# Patient Record
Sex: MALE | Race: ASIAN | NOT HISPANIC OR LATINO | URBAN - METROPOLITAN AREA
[De-identification: names, ages, dates, MRNs, and addresses within clinical notes are randomized per-mention and may not be internally consistent; named-entity substitution may affect disease eponyms.]

---

## 2017-01-01 ENCOUNTER — INPATIENT (INPATIENT)
Facility: HOSPITAL | Age: 72
LOS: 1 days | DRG: 204 | End: 2017-12-23
Attending: INTERNAL MEDICINE | Admitting: INTERNAL MEDICINE
Payer: OTHER MISCELLANEOUS

## 2017-01-01 ENCOUNTER — INPATIENT (INPATIENT)
Facility: HOSPITAL | Age: 72
LOS: 2 days | Discharge: HOME | DRG: 682 | End: 2017-12-21
Attending: INTERNAL MEDICINE | Admitting: INTERNAL MEDICINE
Payer: MEDICARE

## 2017-01-01 VITALS
HEART RATE: 93 BPM | DIASTOLIC BLOOD PRESSURE: 93 MMHG | OXYGEN SATURATION: 100 % | RESPIRATION RATE: 16 BRPM | SYSTOLIC BLOOD PRESSURE: 222 MMHG | TEMPERATURE: 98 F

## 2017-01-01 VITALS — WEIGHT: 105.82 LBS | HEIGHT: 66 IN

## 2017-01-01 VITALS
TEMPERATURE: 97 F | SYSTOLIC BLOOD PRESSURE: 207 MMHG | RESPIRATION RATE: 20 BRPM | HEART RATE: 88 BPM | OXYGEN SATURATION: 100 % | DIASTOLIC BLOOD PRESSURE: 80 MMHG

## 2017-01-01 VITALS
TEMPERATURE: 98 F | DIASTOLIC BLOOD PRESSURE: 26 MMHG | HEART RATE: 95 BPM | RESPIRATION RATE: 17 BRPM | OXYGEN SATURATION: 100 % | SYSTOLIC BLOOD PRESSURE: 109 MMHG

## 2017-01-01 DIAGNOSIS — N18.6 END STAGE RENAL DISEASE: ICD-10-CM

## 2017-01-01 DIAGNOSIS — I10 ESSENTIAL (PRIMARY) HYPERTENSION: ICD-10-CM

## 2017-01-01 DIAGNOSIS — R06.02 SHORTNESS OF BREATH: ICD-10-CM

## 2017-01-01 DIAGNOSIS — R41.0 DISORIENTATION, UNSPECIFIED: ICD-10-CM

## 2017-01-01 DIAGNOSIS — E11.9 TYPE 2 DIABETES MELLITUS WITHOUT COMPLICATIONS: ICD-10-CM

## 2017-01-01 DIAGNOSIS — Z51.5 ENCOUNTER FOR PALLIATIVE CARE: ICD-10-CM

## 2017-01-01 DIAGNOSIS — K11.7 DISTURBANCES OF SALIVARY SECRETION: ICD-10-CM

## 2017-01-01 DIAGNOSIS — Z71.89 OTHER SPECIFIED COUNSELING: ICD-10-CM

## 2017-01-01 DIAGNOSIS — R06.00 DYSPNEA, UNSPECIFIED: ICD-10-CM

## 2017-01-01 DIAGNOSIS — Z95.5 PRESENCE OF CORONARY ANGIOPLASTY IMPLANT AND GRAFT: Chronic | ICD-10-CM

## 2017-01-01 DIAGNOSIS — R52 PAIN, UNSPECIFIED: ICD-10-CM

## 2017-01-01 DIAGNOSIS — R45.1 RESTLESSNESS AND AGITATION: ICD-10-CM

## 2017-01-01 DIAGNOSIS — K59.03 DRUG INDUCED CONSTIPATION: ICD-10-CM

## 2017-01-01 DIAGNOSIS — L98.9 DISORDER OF THE SKIN AND SUBCUTANEOUS TISSUE, UNSPECIFIED: ICD-10-CM

## 2017-01-01 LAB
GLUCOSE BLDC GLUCOMTR-MCNC: 102 MG/DL — HIGH (ref 70–99)
GLUCOSE BLDC GLUCOMTR-MCNC: 99 MG/DL — SIGNIFICANT CHANGE UP (ref 70–99)

## 2017-01-01 PROCEDURE — 82962 GLUCOSE BLOOD TEST: CPT

## 2017-01-01 PROCEDURE — 99223 1ST HOSP IP/OBS HIGH 75: CPT

## 2017-01-01 PROCEDURE — 99285 EMERGENCY DEPT VISIT HI MDM: CPT | Mod: 25

## 2017-01-01 PROCEDURE — 99285 EMERGENCY DEPT VISIT HI MDM: CPT

## 2017-01-01 RX ORDER — HYDRALAZINE HCL 50 MG
10 TABLET ORAL ONCE
Qty: 0 | Refills: 0 | Status: COMPLETED | OUTPATIENT
Start: 2017-01-01 | End: 2017-01-01

## 2017-01-01 RX ORDER — SODIUM CHLORIDE 9 MG/ML
3 INJECTION INTRAMUSCULAR; INTRAVENOUS; SUBCUTANEOUS EVERY 8 HOURS
Qty: 0 | Refills: 0 | Status: DISCONTINUED | OUTPATIENT
Start: 2017-01-01 | End: 2017-01-01

## 2017-01-01 RX ORDER — LABETALOL HCL 100 MG
10 TABLET ORAL ONCE
Qty: 0 | Refills: 0 | Status: COMPLETED | OUTPATIENT
Start: 2017-01-01 | End: 2017-01-01

## 2017-01-01 RX ORDER — ROBINUL 0.2 MG/ML
0.2 INJECTION INTRAMUSCULAR; INTRAVENOUS EVERY 6 HOURS
Qty: 0 | Refills: 0 | Status: DISCONTINUED | OUTPATIENT
Start: 2017-01-01 | End: 2017-01-01

## 2017-01-01 RX ORDER — HALOPERIDOL DECANOATE 100 MG/ML
0.5 INJECTION INTRAMUSCULAR EVERY 6 HOURS
Qty: 0 | Refills: 0 | Status: COMPLETED | OUTPATIENT
Start: 2017-01-01 | End: 2017-01-01

## 2017-01-01 RX ORDER — ACETAMINOPHEN 500 MG
650 TABLET ORAL
Qty: 0 | Refills: 0 | Status: DISCONTINUED | OUTPATIENT
Start: 2017-01-01 | End: 2017-01-01

## 2017-01-01 RX ORDER — HYDROMORPHONE HYDROCHLORIDE 2 MG/ML
2 INJECTION INTRAMUSCULAR; INTRAVENOUS; SUBCUTANEOUS
Qty: 0 | Refills: 0 | Status: DISCONTINUED | OUTPATIENT
Start: 2017-01-01 | End: 2017-01-01

## 2017-01-01 RX ORDER — HYDRALAZINE HCL 50 MG
1 TABLET ORAL
Qty: 0 | Refills: 0 | COMMUNITY
Start: 2017-01-01

## 2017-01-01 RX ORDER — ROBINUL 0.2 MG/ML
0.4 INJECTION INTRAMUSCULAR; INTRAVENOUS EVERY 4 HOURS
Qty: 0 | Refills: 0 | Status: DISCONTINUED | OUTPATIENT
Start: 2017-01-01 | End: 2017-01-01

## 2017-01-01 RX ORDER — HYDRALAZINE HCL 50 MG
10 TABLET ORAL EVERY 6 HOURS
Qty: 0 | Refills: 0 | Status: DISCONTINUED | OUTPATIENT
Start: 2017-01-01 | End: 2017-01-01

## 2017-01-01 RX ORDER — HALOPERIDOL DECANOATE 100 MG/ML
0.5 INJECTION INTRAMUSCULAR EVERY 6 HOURS
Qty: 0 | Refills: 0 | Status: DISCONTINUED | OUTPATIENT
Start: 2017-01-01 | End: 2017-01-01

## 2017-01-01 RX ORDER — HYDRALAZINE HCL 50 MG
10 TABLET ORAL EVERY 8 HOURS
Qty: 0 | Refills: 0 | Status: DISCONTINUED | OUTPATIENT
Start: 2017-01-01 | End: 2017-01-01

## 2017-01-01 RX ORDER — HYDROMORPHONE HYDROCHLORIDE 2 MG/ML
0.5 INJECTION INTRAMUSCULAR; INTRAVENOUS; SUBCUTANEOUS
Qty: 0 | Refills: 0 | Status: DISCONTINUED | OUTPATIENT
Start: 2017-01-01 | End: 2017-01-01

## 2017-01-01 RX ORDER — SODIUM CHLORIDE 9 MG/ML
1000 INJECTION INTRAMUSCULAR; INTRAVENOUS; SUBCUTANEOUS
Qty: 0 | Refills: 0 | Status: DISCONTINUED | OUTPATIENT
Start: 2017-01-01 | End: 2017-01-01

## 2017-01-01 RX ORDER — HYDROMORPHONE HYDROCHLORIDE 2 MG/ML
0.5 INJECTION INTRAMUSCULAR; INTRAVENOUS; SUBCUTANEOUS ONCE
Qty: 0 | Refills: 0 | Status: DISCONTINUED | OUTPATIENT
Start: 2017-01-01 | End: 2017-01-01

## 2017-01-01 RX ORDER — FENTANYL CITRATE 50 UG/ML
50 INJECTION INTRAVENOUS
Qty: 0 | Refills: 0 | Status: DISCONTINUED | OUTPATIENT
Start: 2017-01-01 | End: 2017-01-01

## 2017-01-01 RX ORDER — HYDROMORPHONE HYDROCHLORIDE 2 MG/ML
1 INJECTION INTRAMUSCULAR; INTRAVENOUS; SUBCUTANEOUS
Qty: 100 | Refills: 0 | Status: DISCONTINUED | OUTPATIENT
Start: 2017-01-01 | End: 2017-01-01

## 2017-01-01 RX ORDER — HALOPERIDOL DECANOATE 100 MG/ML
5 INJECTION INTRAMUSCULAR ONCE
Qty: 0 | Refills: 0 | Status: COMPLETED | OUTPATIENT
Start: 2017-01-01 | End: 2017-01-01

## 2017-01-01 RX ORDER — HYDRALAZINE HCL 50 MG
10 TABLET ORAL ONCE
Qty: 0 | Refills: 0 | Status: DISCONTINUED | OUTPATIENT
Start: 2017-01-01 | End: 2017-01-01

## 2017-01-01 RX ADMIN — HYDROMORPHONE HYDROCHLORIDE 2 MILLIGRAM(S): 2 INJECTION INTRAMUSCULAR; INTRAVENOUS; SUBCUTANEOUS at 11:25

## 2017-01-01 RX ADMIN — FENTANYL CITRATE 50 MICROGRAM(S): 50 INJECTION INTRAVENOUS at 08:15

## 2017-01-01 RX ADMIN — HYDROMORPHONE HYDROCHLORIDE 0.5 MILLIGRAM(S): 2 INJECTION INTRAMUSCULAR; INTRAVENOUS; SUBCUTANEOUS at 05:10

## 2017-01-01 RX ADMIN — FENTANYL CITRATE 50 MICROGRAM(S): 50 INJECTION INTRAVENOUS at 05:45

## 2017-01-01 RX ADMIN — Medication 1 MILLIGRAM(S): at 15:40

## 2017-01-01 RX ADMIN — Medication 1 MILLIGRAM(S): at 18:36

## 2017-01-01 RX ADMIN — Medication 2 MILLIGRAM(S): at 00:22

## 2017-01-01 RX ADMIN — Medication 10 MILLIGRAM(S): at 14:20

## 2017-01-01 RX ADMIN — Medication 1 MILLIGRAM(S): at 12:20

## 2017-01-01 RX ADMIN — FENTANYL CITRATE 50 MICROGRAM(S): 50 INJECTION INTRAVENOUS at 02:52

## 2017-01-01 RX ADMIN — HYDROMORPHONE HYDROCHLORIDE 0.5 MILLIGRAM(S): 2 INJECTION INTRAMUSCULAR; INTRAVENOUS; SUBCUTANEOUS at 18:07

## 2017-01-01 RX ADMIN — Medication 1 MILLIGRAM(S): at 02:52

## 2017-01-01 RX ADMIN — FENTANYL CITRATE 50 MICROGRAM(S): 50 INJECTION INTRAVENOUS at 14:01

## 2017-01-01 RX ADMIN — FENTANYL CITRATE 50 MICROGRAM(S): 50 INJECTION INTRAVENOUS at 07:11

## 2017-01-01 RX ADMIN — FENTANYL CITRATE 50 MICROGRAM(S): 50 INJECTION INTRAVENOUS at 08:31

## 2017-01-01 RX ADMIN — ROBINUL 0.2 MILLIGRAM(S): 0.2 INJECTION INTRAMUSCULAR; INTRAVENOUS at 18:37

## 2017-01-01 RX ADMIN — FENTANYL CITRATE 50 MICROGRAM(S): 50 INJECTION INTRAVENOUS at 09:56

## 2017-01-01 RX ADMIN — HYDROMORPHONE HYDROCHLORIDE 0.5 MILLIGRAM(S): 2 INJECTION INTRAMUSCULAR; INTRAVENOUS; SUBCUTANEOUS at 21:13

## 2017-01-01 RX ADMIN — FENTANYL CITRATE 50 MICROGRAM(S): 50 INJECTION INTRAVENOUS at 14:00

## 2017-01-01 RX ADMIN — SODIUM CHLORIDE 3 MILLILITER(S): 9 INJECTION INTRAMUSCULAR; INTRAVENOUS; SUBCUTANEOUS at 21:11

## 2017-01-01 RX ADMIN — Medication 10 MILLIGRAM(S): at 14:00

## 2017-01-01 RX ADMIN — SODIUM CHLORIDE 3 MILLILITER(S): 9 INJECTION INTRAMUSCULAR; INTRAVENOUS; SUBCUTANEOUS at 05:10

## 2017-01-01 RX ADMIN — Medication 10 MILLIGRAM(S): at 22:18

## 2017-01-01 RX ADMIN — FENTANYL CITRATE 50 MICROGRAM(S): 50 INJECTION INTRAVENOUS at 18:42

## 2017-01-01 RX ADMIN — SODIUM CHLORIDE 3 MILLILITER(S): 9 INJECTION INTRAMUSCULAR; INTRAVENOUS; SUBCUTANEOUS at 14:05

## 2017-01-01 RX ADMIN — Medication 0.5 MILLIGRAM(S): at 14:26

## 2017-01-01 RX ADMIN — HYDROMORPHONE HYDROCHLORIDE 0.5 MILLIGRAM(S): 2 INJECTION INTRAMUSCULAR; INTRAVENOUS; SUBCUTANEOUS at 20:59

## 2017-01-01 RX ADMIN — Medication 10 MILLIGRAM(S): at 09:00

## 2017-01-01 RX ADMIN — SODIUM CHLORIDE 3 MILLILITER(S): 9 INJECTION INTRAMUSCULAR; INTRAVENOUS; SUBCUTANEOUS at 21:54

## 2017-01-01 RX ADMIN — HYDROMORPHONE HYDROCHLORIDE 0.5 MILLIGRAM(S): 2 INJECTION INTRAMUSCULAR; INTRAVENOUS; SUBCUTANEOUS at 21:29

## 2017-01-01 RX ADMIN — ROBINUL 0.4 MILLIGRAM(S): 0.2 INJECTION INTRAMUSCULAR; INTRAVENOUS at 11:16

## 2017-01-01 RX ADMIN — FENTANYL CITRATE 50 MICROGRAM(S): 50 INJECTION INTRAVENOUS at 12:21

## 2017-01-01 RX ADMIN — Medication 0.1 MILLIGRAM(S): at 21:50

## 2017-01-01 RX ADMIN — Medication 1 MILLIGRAM(S): at 08:31

## 2017-01-01 RX ADMIN — Medication 10 MILLIGRAM(S): at 05:56

## 2017-01-01 RX ADMIN — FENTANYL CITRATE 50 MICROGRAM(S): 50 INJECTION INTRAVENOUS at 18:37

## 2017-01-01 RX ADMIN — SODIUM CHLORIDE 3 MILLILITER(S): 9 INJECTION INTRAMUSCULAR; INTRAVENOUS; SUBCUTANEOUS at 05:54

## 2017-01-01 RX ADMIN — SODIUM CHLORIDE 3 MILLILITER(S): 9 INJECTION INTRAMUSCULAR; INTRAVENOUS; SUBCUTANEOUS at 21:13

## 2017-01-01 RX ADMIN — FENTANYL CITRATE 50 MICROGRAM(S): 50 INJECTION INTRAVENOUS at 08:32

## 2017-01-01 RX ADMIN — Medication 1 MILLIGRAM(S): at 17:03

## 2017-01-01 RX ADMIN — SODIUM CHLORIDE 3 MILLILITER(S): 9 INJECTION INTRAMUSCULAR; INTRAVENOUS; SUBCUTANEOUS at 13:00

## 2017-01-01 RX ADMIN — Medication 0.5 MILLIGRAM(S): at 09:43

## 2017-01-01 RX ADMIN — Medication 1 MILLIGRAM(S): at 06:54

## 2017-01-01 RX ADMIN — FENTANYL CITRATE 50 MICROGRAM(S): 50 INJECTION INTRAVENOUS at 00:53

## 2017-01-01 RX ADMIN — HYDROMORPHONE HYDROCHLORIDE 0.5 MILLIGRAM(S): 2 INJECTION INTRAMUSCULAR; INTRAVENOUS; SUBCUTANEOUS at 19:02

## 2017-01-01 RX ADMIN — Medication 10 MILLIGRAM(S): at 05:10

## 2017-01-01 RX ADMIN — Medication 10 MILLIGRAM(S): at 23:57

## 2017-01-01 RX ADMIN — HYDROMORPHONE HYDROCHLORIDE 0.5 MILLIGRAM(S): 2 INJECTION INTRAMUSCULAR; INTRAVENOUS; SUBCUTANEOUS at 05:40

## 2017-01-01 RX ADMIN — Medication 10 MILLIGRAM(S): at 11:58

## 2017-01-01 RX ADMIN — Medication 1 MILLIGRAM(S): at 05:20

## 2017-01-01 RX ADMIN — Medication 1 MILLIGRAM(S): at 21:48

## 2017-01-01 RX ADMIN — Medication 0.1 MILLIGRAM(S): at 05:56

## 2017-01-01 RX ADMIN — HALOPERIDOL DECANOATE 5 MILLIGRAM(S): 100 INJECTION INTRAMUSCULAR at 21:45

## 2017-01-01 RX ADMIN — SODIUM CHLORIDE 3 MILLILITER(S): 9 INJECTION INTRAMUSCULAR; INTRAVENOUS; SUBCUTANEOUS at 06:19

## 2017-01-01 RX ADMIN — Medication 2 MILLIGRAM(S): at 11:16

## 2017-01-01 RX ADMIN — FENTANYL CITRATE 50 MICROGRAM(S): 50 INJECTION INTRAVENOUS at 05:34

## 2017-01-01 RX ADMIN — ROBINUL 0.2 MILLIGRAM(S): 0.2 INJECTION INTRAMUSCULAR; INTRAVENOUS at 12:20

## 2017-01-01 RX ADMIN — Medication 10 MILLIGRAM(S): at 13:00

## 2017-01-01 RX ADMIN — HYDROMORPHONE HYDROCHLORIDE 0.5 MILLIGRAM(S): 2 INJECTION INTRAMUSCULAR; INTRAVENOUS; SUBCUTANEOUS at 02:38

## 2017-01-01 RX ADMIN — Medication 1 MILLIGRAM(S): at 10:01

## 2017-01-01 RX ADMIN — HYDROMORPHONE HYDROCHLORIDE 2 MILLIGRAM(S): 2 INJECTION INTRAMUSCULAR; INTRAVENOUS; SUBCUTANEOUS at 11:16

## 2017-01-01 RX ADMIN — SODIUM CHLORIDE 3 MILLILITER(S): 9 INJECTION INTRAMUSCULAR; INTRAVENOUS; SUBCUTANEOUS at 13:56

## 2017-01-01 RX ADMIN — Medication 1 MILLIGRAM(S): at 20:08

## 2017-01-01 RX ADMIN — Medication 10 MILLIGRAM(S): at 17:56

## 2017-01-01 RX ADMIN — FENTANYL CITRATE 50 MICROGRAM(S): 50 INJECTION INTRAVENOUS at 03:10

## 2017-01-01 RX ADMIN — Medication 1 MILLIGRAM(S): at 03:42

## 2017-01-01 RX ADMIN — Medication 2 MILLIGRAM(S): at 05:36

## 2017-01-01 RX ADMIN — HALOPERIDOL DECANOATE 0.5 MILLIGRAM(S): 100 INJECTION INTRAMUSCULAR at 18:08

## 2017-01-01 RX ADMIN — Medication 1 MILLIGRAM(S): at 08:16

## 2017-01-01 RX ADMIN — FENTANYL CITRATE 50 MICROGRAM(S): 50 INJECTION INTRAVENOUS at 00:41

## 2017-01-01 RX ADMIN — Medication 1 MILLIGRAM(S): at 22:19

## 2017-01-01 RX ADMIN — Medication 10 MILLIGRAM(S): at 05:37

## 2017-01-01 RX ADMIN — HYDROMORPHONE HYDROCHLORIDE 0.5 MILLIGRAM(S): 2 INJECTION INTRAMUSCULAR; INTRAVENOUS; SUBCUTANEOUS at 03:08

## 2017-01-01 RX ADMIN — FENTANYL CITRATE 50 MICROGRAM(S): 50 INJECTION INTRAVENOUS at 17:03

## 2017-01-01 RX ADMIN — FENTANYL CITRATE 50 MICROGRAM(S): 50 INJECTION INTRAVENOUS at 06:54

## 2017-01-01 RX ADMIN — FENTANYL CITRATE 50 MICROGRAM(S): 50 INJECTION INTRAVENOUS at 05:20

## 2017-01-01 RX ADMIN — HYDROMORPHONE HYDROCHLORIDE 0.5 MILLIGRAM(S): 2 INJECTION INTRAMUSCULAR; INTRAVENOUS; SUBCUTANEOUS at 20:07

## 2017-01-01 RX ADMIN — FENTANYL CITRATE 50 MICROGRAM(S): 50 INJECTION INTRAVENOUS at 10:09

## 2017-01-01 RX ADMIN — Medication 1 MILLIGRAM(S): at 05:38

## 2017-01-01 RX ADMIN — FENTANYL CITRATE 50 MICROGRAM(S): 50 INJECTION INTRAVENOUS at 01:40

## 2017-01-01 RX ADMIN — HYDROMORPHONE HYDROCHLORIDE 1 MG/HR: 2 INJECTION INTRAMUSCULAR; INTRAVENOUS; SUBCUTANEOUS at 11:57

## 2017-01-01 RX ADMIN — FENTANYL CITRATE 50 MICROGRAM(S): 50 INJECTION INTRAVENOUS at 06:00

## 2017-01-01 RX ADMIN — Medication 1 MILLIGRAM(S): at 14:00

## 2017-01-01 RX ADMIN — FENTANYL CITRATE 50 MICROGRAM(S): 50 INJECTION INTRAVENOUS at 12:25

## 2017-01-01 RX ADMIN — SODIUM CHLORIDE 10 MILLILITER(S): 9 INJECTION INTRAMUSCULAR; INTRAVENOUS; SUBCUTANEOUS at 11:58

## 2017-01-01 RX ADMIN — Medication 0.5 MILLIGRAM(S): at 23:57

## 2017-01-01 RX ADMIN — FENTANYL CITRATE 50 MICROGRAM(S): 50 INJECTION INTRAVENOUS at 02:00

## 2017-01-01 RX ADMIN — HALOPERIDOL DECANOATE 0.5 MILLIGRAM(S): 100 INJECTION INTRAMUSCULAR at 23:39

## 2017-01-01 RX ADMIN — Medication 10 MILLIGRAM(S): at 23:46

## 2017-01-01 RX ADMIN — FENTANYL CITRATE 50 MICROGRAM(S): 50 INJECTION INTRAVENOUS at 18:26

## 2017-01-01 RX ADMIN — FENTANYL CITRATE 50 MICROGRAM(S): 50 INJECTION INTRAVENOUS at 10:01

## 2017-12-18 NOTE — H&P ADULT - PROBLEM SELECTOR PLAN 5
Talked to son Hector on Ph . He states that he is the HCP, HCP paper are with HD center, that he will bring tomorrow. He wants comfort measures for his father and refuses to continue HD. He understands that,  withholding HD will eventually cause fluid overload in the body and will likely cause respiratory distress that can lead to death. Son don't want any lab work done on patient.   Palliative team to follow in the morning.

## 2017-12-18 NOTE — H&P ADULT - ASSESSMENT
72 M with pmh of ESRD, dementia, DM and Htn was brought to ED by family for comfort measures for the patient.

## 2017-12-18 NOTE — ED ADULT NURSE NOTE - ED STAT RN HANDOFF DETAILS 2
Pt noted awake confused screaming at times b/p remains high NP Odetta made aware Hydralazine 10 mg IVP given Pt admitted to 6S for comfort measurement hand off report given to Mariia WISEMAN

## 2017-12-18 NOTE — H&P ADULT - PROBLEM SELECTOR PLAN 1
-Patient and son Hector don't want HD done. Last HD was one week ago. Patient goes to [facility]. Dr Hebert is nephrologist.   -Holding HD for now as son refuses to consent for HD.

## 2017-12-18 NOTE — H&P ADULT - HISTORY OF PRESENT ILLNESS
72 M with pmh of ESRD, dementia, DM and Htn was brought to ED by family for comfort measures for the patient. Patient is a dialysis patient, goes to [facility] HD center. last HD was on Tuesday (one week ago). Now patient and son don't want to continue HD. Patient is confused AxO x0 on examination. ROS and history is limited and is as per Celestino Young. ROS positive for mild cough, negative for fever, SOB, chest pain, headache, pedal edema.    Talked to son Hector on Ph . He states that he is the HCP, HCP paper are with HD center, that he will bring tomorrow. He wants comfort measures for his father and refuses to continue HD. He understands that,  withholding HD will eventually cause fluid overload in the body and will likely cause respiratory distress that can lead to death. Son don't want any lab work done on patient.

## 2017-12-18 NOTE — H&P ADULT - PROBLEM SELECTOR PLAN 3
-BP is running on higher side. Suggest to use hydralazine iv for BP control aiming comfort measures.

## 2017-12-18 NOTE — H&P ADULT - NSHPLABSRESULTS_GEN_ALL_CORE
Vital Signs Last 24 Hrs  T(C): 36.3 (18 Dec 2017 19:53), Max: 36.3 (18 Dec 2017 19:53)  T(F): 97.4 (18 Dec 2017 19:53), Max: 97.4 (18 Dec 2017 19:53)  HR: 95 (18 Dec 2017 19:53) (95 - 95)  BP: 223/100 (18 Dec 2017 19:53) (223/100 - 223/100)  BP(mean): --  RR: 20 (18 Dec 2017 19:53) (20 - 20)  SpO2: 100% (18 Dec 2017 19:53) (100% - 100%)

## 2017-12-18 NOTE — ED ADULT NURSE NOTE - NS ED NURSE RECORD ANOTHER HT AND WT
Counseling and Referral of Other Preventative  (Italic type indicates deductible and co-insurance are waived)    Patient Name: Jaymie Mtz  Today's Date: 3/27/2017      SERVICE LIMITATIONS RECOMMENDATION    Vaccines    · Pneumococcal (once after 65)    · Influenza (annually)    · Hepatitis B (if medium/high risk)    · Prevnar 13      Hepatitis B medium/high risk factors:       - End-stage renal disease       - Hemophiliacs who received Factor VII or         IX concentrates       - Clients of institutions for the mentally             retarded       - Persons who live in the same house as          a HepB carrier       - Homosexual men       - Illicit injectable drug abusers     Pneumococcal: Done, no repeat necessary     Influenza: Done, repeat in one year     Hepatitis B: N/A     Prevnar 13: Recommended to patient, due 4/19/2017    Mammogram (biennial age 50-74)  Annually (age 40 or over)  Last done 12/2016, recommend to repeat every 1  years    Pap (up to age 70 and after 70 if unknown history or abnormal study last 10 years)    Last done 4/18/2016, recommend to repeat every 3  years     The USPSTF recommends screening for cervical cancer in women age 21 to 65 years with cytology (Pap smear) every 3 years.     Colorectal cancer screening (to age 75)    · Fecal occult blood test (annual)  · Flexible sigmoidoscopy (5y)  · Screening colonoscopy (10y)  · Barium enema   Last done 9/17/2015, recommend to repeat every 10  years    Diabetes self-management training (no USPSTF recommendations)  Requires referral by treating physician for patient with diabetes or renal disease. 10 hours of initial DSMT sessions of no less than 30 minutes each in a continuous 12-month period. 2 hours of follow-up DSMT in subsequent years.  N/A    Bone mass measurements (age 65 & older, biennial)  Requires diagnosis related to osteoporosis or estrogen deficiency. Biennial benefit unless patient has history of long-term glucocorticoid  N/A     Glaucoma screening (no USPSTF recommendation)  Diabetes mellitus, family history   , age 50 or over    American, age 65 or over  completed 5/27/2016, next due 5/27/2017    Medical nutrition therapy for diabetes or renal disease (no recommended schedule)  Requires referral by treating physician for patient with diabetes or renal disease or kidney transplant within the past 3 years.  Can be provided in same year as diabetes self-management training (DSMT), and CMS recommends medical nutrition therapy take place after DSMT. Up to 3 hours for initial year and 2 hours in subsequent years.  n/a    Cardiovascular screening blood tests (every 5 years)  · Fasting lipid panel  Order as a panel if possible  Last done 11/30/2016, recommend to repeat every 1  years    Diabetes screening tests (at least every 3 years, Medicare covers annually or at 6-month intervals for prediabetic patients)  · Fasting blood sugar (FBS) or glucose tolerance test (GTT)  Patient must be diagnosed with one of the following:       - Hypertension       - Dyslipidemia       - Obesity (BMI 30kg/m2)       - Previous elevated impaired FBS or GTT       ... or any two of the following:       - Overweight (BMI 25 but <30)       - Family history of diabetes       - Age 65 or older       - History of gestational diabetes or birth of baby weighing more than 9 pounds  Done this year, repeat every year    Abdominal aortic aneurysm screening (once)  · Sonogram   Limited to patients who meet one of the following criteria:       - Men who are 65-75 years old and have smoked more than 100 cigarette in their lifetime       - Anyone with a family history of abdominal aortic aneurysm       - Anyone recommended for screening by the USPSTF  N/A    HIV screening (annually for increased risk patients)  · HIV-1 and HIV-2 by EIA, or VERNON, rapid antibody test or oral mucosa transudate  Patients must be at increased risk for HIV infection per USPSTF  guidelines or pregnant. Tests covered annually for patient at increased risk or as requested by the patient. Pregnant patients may receive up to 3 tests during pregnancy.  Risks discussed, screening is not recommended    Smoking cessation counseling (up to 8 sessions per year)  Patients must be asymptomatic of tobacco-related conditions to receive as a preventative service.  n/a    Subsequent annual wellness visit  At least 12 months since last AWV  Return in one year     The following information is provided to all patients.  This information is to help you find resources for any of the problems found today that may be affecting your health:                Living healthy guide: www.[facility].[place].[facility]      Understanding Diabetes: www.diabetes.org      Eating healthy: www.cdc.gov/healthyweight      CDC home safety checklist: www.cdc.gov/steadi/patient.html      Agency on Aging: www.goea.[place].[facility]      Alcoholics anonymous (AA): www.aa.org      Physical Activity: www.bob.nih.gov/ne6kjft      Tobacco use: www.quitwithusla.org      Yes

## 2017-12-18 NOTE — ED PROVIDER NOTE - OBJECTIVE STATEMENT
71 y/o M w/ PMHx of ESRD BIB son to ED for hospice care. Pt's son is his health proxy. He states that pt is choosing to not continue dialysis, he has not been dialyzed for one week now. He has been increasingly agitated, shortness of breath, w/ a cough. Dialysis center has referred pt to ED for admission for hospice care. Pt's son confirms that he is DNR/DNI.

## 2017-12-19 NOTE — CONSULT NOTE ADULT - SUBJECTIVE AND OBJECTIVE BOX
HPI:  72 M with pmh of ESRD, dementia, DM and Htn was brought to ED by family for comfort measures for the patient. Last HD was on Tuesday (one week ago). Now patient and son don't want to continue HD. Patient was agitated overnight, requiring PRN doses of Haldol, Ativan.    PAST MEDICAL & SURGICAL HISTORY:  ESRD (end stage renal disease)  HLD (hyperlipidemia)  HTN (hypertension), benign  Coronary artery disease  History of coronary artery stent placement    SOCIAL HISTORY:    Admitted from:  home, lives with son  Substance abuse history:              Tobacco hx: Unknown                 Alcohol hx: Unknown             Home Opioid hx: None  Temple:   Anabaptism                                 Preferred Language: English    Surrogate/HCP/Guardian:  Hector Simpson (Son, HCP) [phone]            FAMILY HISTORY: Unknown    Baseline ADLs (prior to admission): Ambulatory, with assistance    Allergies:  No Known Allergies           Review of Systems:  Unable to obtain due to poor mentation    MEDICATIONS  (STANDING):  hydrALAZINE 10 milliGRAM(s) Oral every 6 hours  sodium chloride 0.9% lock flush 3 milliLiter(s) IV Push every 8 hours    MEDICATIONS  (PRN):  haloperidol    Injectable 0.5 milliGRAM(s) IV Push every 6 hours PRN Agitation    PHYSICAL EXAM:    Vital Signs Last 24 Hrs  T(C): 36.4 (19 Dec 2017 07:44), Max: 36.7 (19 Dec 2017 06:04)  T(F): 97.5 (19 Dec 2017 07:44), Max: 98.1 (19 Dec 2017 06:04)  HR: 97 (19 Dec 2017 09:08) (81 - 111)  BP: 186/95 (19 Dec 2017 09:08) (185/83 - 223/100)  RR: 18 (19 Dec 2017 09:08) (18 - 20)  SpO2: 100% (19 Dec 2017 09:08) (96% - 100%)    General: alert  oriented x _1-2___    lethargic, cachexia, minimally verbal    Karnofsky Performance Score/Palliative Performance Status Version2:  20%    HEENT: normal  dry mouth  ET tube/trach oral lesions:  Lungs: comfortable tachypnea/labored breathing  excessive secretions  CV: normal  tachycardia  GI: normal  distended  tender  incontinent               PEG/NG/OG tube  constipation  last BM:   : normal  incontinent  oliguria/anuria  fuentes  Musculoskeletal: normal  weakness  edema             ambulatory  bedbound/wheelchair bound  Skin: normal  pressure ulcers: stage: edema: other:  Neuro: no deficits cognitive impairment dsyphagia/dysarthria paresis: other:  Oral intake ability: unable/only mouth care [minimal moderate full capability]  Diet: [NPO]    LABS:              RADIOLOGY & ADDITIONAL STUDIES:    ADVANCE DIRECTIVES:   Advanced Care Planning discussion total time spent: HPI:  72 M with pmh of ESRD, dementia, DM and Htn was brought to ED by family for comfort measures for the patient. Last HD was on Tuesday (one week ago). Now patient and son don't want to continue HD. Patient was agitated overnight, requiring PRN doses of Haldol, Ativan.    PAST MEDICAL & SURGICAL HISTORY:  ESRD (end stage renal disease)  HLD (hyperlipidemia)  HTN (hypertension), benign  Coronary artery disease  History of coronary artery stent placement    SOCIAL HISTORY:    Admitted from:  home, lives with son  Substance abuse history:              Tobacco hx: Unknown                 Alcohol hx: Unknown             Home Opioid hx: None  Yarsanism:   Faith                                 Preferred Language: English    Surrogate/HCP/Guardian:  Hector Simpson (Son, HCP) [phone]            FAMILY HISTORY: Unknown    Baseline ADLs (prior to admission): Ambulatory, with assistance    Allergies:  No Known Allergies           Review of Systems:  Unable to obtain due to poor mentation    MEDICATIONS  (STANDING):  hydrALAZINE 10 milliGRAM(s) Oral every 6 hours  sodium chloride 0.9% lock flush 3 milliLiter(s) IV Push every 8 hours    MEDICATIONS  (PRN):  haloperidol    Injectable 0.5 milliGRAM(s) IV Push every 6 hours PRN Agitation    PHYSICAL EXAM:    Vital Signs Last 24 Hrs  T(C): 36.4 (19 Dec 2017 07:44), Max: 36.7 (19 Dec 2017 06:04)  T(F): 97.5 (19 Dec 2017 07:44), Max: 98.1 (19 Dec 2017 06:04)  HR: 97 (19 Dec 2017 09:08) (81 - 111)  BP: 186/95 (19 Dec 2017 09:08) (185/83 - 223/100)  RR: 18 (19 Dec 2017 09:08) (18 - 20)  SpO2: 100% (19 Dec 2017 09:08) (96% - 100%)    General: alert  oriented x _1-2___    lethargic, cachexia, minimally verbal    Karnofsky Performance Score/Palliative Performance Status Version2:  20%    HEENT: Dry Mouth  Lungs: Mild dyspnea    CV: tachycardia  GI: Incontinent   : Incontinent   Musculoskeletal: Weakness, Bedbound  Skin: normal, no edema  Neuro: Cognitive impairment   Oral intake ability: unable/only mouth care   Diet: NPO    ADVANCE DIRECTIVES: DNR/DNI

## 2017-12-19 NOTE — CONSULT NOTE ADULT - PROBLEM SELECTOR RECOMMENDATION 9
On HD for the last year; last diaylsis one week ago; patient and son no longer want to continue dialysis and want hospice care

## 2017-12-19 NOTE — ED ADULT NURSE REASSESSMENT NOTE - NS ED NURSE REASSESS COMMENT FT1
REceived Pt sleeping on stretcher easily arose breathing unlabored awaiting for bed safety maintained

## 2017-12-19 NOTE — CONSULT NOTE ADULT - PROBLEM SELECTOR RECOMMENDATION 4
Spoke with son Ab Simpson over the phone; he is his father's HCP and will bring in paperwork later today; he wants inpatient hospice for his father; no more diaylsis, no more blood work or further testing; MEWS suspended; patient is DNR/DNI Spoke with son Hector Simpson over the phone; he is his father's HCP and will bring in paperwork later today; he wants inpatient hospice for his father; no more diaylsis, no more blood work or further testing; MEWS suspended; patient is DNR/DNI

## 2017-12-20 NOTE — DISCHARGE NOTE ADULT - MEDICATION SUMMARY - MEDICATIONS TO TAKE
I will START or STAY ON the medications listed below when I get home from the hospital:    cloNIDine 0.1 mg oral tablet  -- 1 tab(s) by mouth once a day  -- Indication: For HTN (hypertension)    hydrALAZINE 10 mg oral tablet  -- 1 tab(s) by mouth every 6 hours  -- Indication: For HTN (hypertension)

## 2017-12-20 NOTE — DISCHARGE NOTE ADULT - PLAN OF CARE
comfort care as per family, patient to get comfort care and symptom control. Hospice team consulted and patient to be discharged to hospice.

## 2017-12-20 NOTE — DISCHARGE NOTE ADULT - PATIENT PORTAL LINK FT
“You can access the Follow[facility] Patient Portal, offered by [facility], by registering with the following website: [email or web address]”

## 2017-12-20 NOTE — DISCHARGE NOTE ADULT - MEDICATION SUMMARY - MEDICATIONS TO STOP TAKING
I will STOP taking the medications listed below when I get home from the hospital:    atorvastatin 80 mg oral tablet  -- 1 tab(s) by mouth once a day (at bedtime)    Januvia 25 mg oral tablet  -- 1 tab(s) by mouth once a day

## 2017-12-20 NOTE — DISCHARGE NOTE ADULT - HOSPITAL COURSE
72 M with pmh of ESRD, dementia, DM and Htn was brought to ED by family for comfort measures for the patient. Patient is a dialysis patient, goes to [facility] HD center. last HD was on Tuesday (one week ago). Now patient and son don't want to continue HD. Patient is confused AxO x0 on examination. ROS and history is limited and is as per Son Hector. ROS positive for mild cough, negative for fever, SOB, chest pain, headache, pedal edema. Patient and family doesnt want dialysis, labs or other interventions and wanted comfort care hence patient to ne discharged to hospice. discussed with attending.

## 2017-12-20 NOTE — DISCHARGE NOTE ADULT - CARE PLAN
Principal Discharge DX:	Comfort measures only status  Goal:	comfort care  Instructions for follow-up, activity and diet:	as per family, patient to get comfort care and symptom control. Hospice team consulted and patient to be discharged to hospice.

## 2017-12-21 PROBLEM — N18.6 END STAGE RENAL DISEASE: Chronic | Status: ACTIVE | Noted: 2017-01-01

## 2017-12-21 NOTE — PROGRESS NOTE ADULT - ASSESSMENT
72 M with pmh of ESRD, dementia, DM and Htn was brought to ED by family for comfort measures for the patient. Patient is a dialysis patient, goes to [facility] HD center. last HD was on Tuesday (one week ago). Now patient and son don't want to continue HD. Patient is confused AxO x0 on examination. ROS and history is limited and is as per Celestino Young. ROS positive for mild cough, negative for fever, SOB, chest pain, headache, pedal edema. Talked to son Hector on Ph . He states that he is the HCP, HCP paper are with HD center, that he will bring tomorrow. He wants comfort measures for his father and refuses to continue HD. He understands that,  withholding HD will eventually cause fluid overload in the body and will likely cause respiratory distress that can lead to death. Son don't want any lab work done on patient.     Patient is to be transferred to inpatient hospice

## 2017-12-21 NOTE — H&P ADULT - HISTORY OF PRESENT ILLNESS
72 year old male with hospice diagnosis of ESRD secondary to HTN and DM, dementia brought to ER after family decided to stop HD.  The patient was noted to have dyspnea and increased tremors.  The patient was admitted to the IPU at [facility] for IV management of dyspnea.

## 2017-12-21 NOTE — PROGRESS NOTE ADULT - SUBJECTIVE AND OBJECTIVE BOX
INTERVAL HPI/OVERNIGHT EVENTS: patient was hypertensive    VITAL SIGNS:  T(F): 98.2 (12-21-17 @ 05:13)  HR: 74 (12-21-17 @ 08:22)  BP: 156/95 (12-21-17 @ 08:22)  RR: 16 (12-21-17 @ 05:13)  SpO2: 98% (12-21-17 @ 05:13)  Wt(kg): --    PHYSICAL EXAM:    Constitutional: NAD  Eyes: PERRL  ENMT: no external lesions   Neck: supple, no JVD   Respiratory: scattered rhonchi   Cardiovascular:s1,s2 present,   Gastrointestinal:soft  Extremities: no cyanosis, edema   Vascular: pulses intact     MEDICATIONS  (STANDING):  cloNIDine 0.1 milliGRAM(s) Oral daily  hydrALAZINE 10 milliGRAM(s) Oral every 6 hours  sodium chloride 0.9% lock flush 3 milliLiter(s) IV Push every 8 hours    MEDICATIONS  (PRN):  HYDROmorphone  Injectable 0.5 milliGRAM(s) IV Push every 1 hour PRN Dyspnea  LORazepam   Injectable 0.5 milliGRAM(s) IV Push every 4 hours PRN Agitation      Allergies    No Known Allergies    Intolerances        LABS:  no labs ordered as patient supposed to go inpatient hospice.                 RADIOLOGY & ADDITIONAL TESTS:

## 2017-12-21 NOTE — H&P ADULT - ASSESSMENT
72 year old male with hospice diagnosis of ESRD and dementia admitted to the IPU at [facility] for IV management of dyspnea.

## 2017-12-21 NOTE — H&P ADULT - NSHPPHYSICALEXAM_GEN_ALL_CORE
thin male in no respiratory distress  203/96 93 97.2 20 100%  HEENT: moderate bitemporal wasting  Neck: no JVD no nodes no thyromegaly  Lungs: decreased breath sounds and rales at bases  Heart: s1s2 no audible murmurs  Abd: soft ND NT no masses no organomegaly  Ext: no edema no redness no calf tenderness

## 2017-12-22 NOTE — PROGRESS NOTE ADULT - SUBJECTIVE AND OBJECTIVE BOX
History of Present Illness:  Chief Complaint/Reason for Admission: dyspnea	  History of Present Illness: 	  72 year old male with hospice diagnosis of ESRD secondary to HTN and DM, dementia brought to ER after family decided to stop HD.  The patient was noted to have dyspnea and increased tremors.  The patient was admitted to the IPU at [facility] for IV management of dyspnea.     Review of Systems:  Review of Systems: not obtainable secondary to lethargy	      Allergies and Intolerances:        Allergies:  	No Known Allergies:     Home Medications:   * Patient Currently Takes Medications as of 20-Dec-2017 14:29 documented in Structured Notes  · 	hydrALAZINE 10 mg oral tablet: 1 tab(s) orally every 6 hours  · 	cloNIDine 0.1 mg oral tablet: 1 tab(s) orally once a day    Patient History:    Family History:  No pertinent family history in first degree relatives.     Social History:  Social History (marital status, living situation, occupation, tobacco use, alcohol and drug use, and sexual history): no smoking, no alcohol	     Tobacco Screening:  · Core Measure Site	Yes	  · Has the patient used tobacco in the past 30 days?	No	    Risk Assessment:    Present on Admission:  Deep Venous Thrombosis	no	  Pulmonary Embolus	no	     Heart Failure:  Does this patient have a history of or has been diagnosed with heart failure? no.       Physical Exam:  Physical Exam: thin male in no respiratory distress  203/96 93 97.2 20 100%  HEENT: moderate bitemporal wasting  Neck: no JVD no nodes no thyromegaly  Lungs: decreased breath sounds and rales at bases  Heart: s1s2 no audible murmurs  Abd: soft ND NT no masses no organomegaly Ext: no edema no redness no calf tenderness	    Assessment and Plan:    Assessment:  · Assessment		  72 year old male with hospice diagnosis of ESRD and dementia admitted to the IPU at [facility] for IV management of dyspnea.     Problem/Plan - 1:  ·  Problem: Dyspnea, unspecified type.  Plan: fentanyl 50 mcg IVP every 10 minutes as needed.      Problem/Plan - 2:  ·  Problem: Pain.  Plan: same as above.      Problem/Plan - 3:  ·  Problem: Increased oropharyngeal secretions.  Plan: robinul 0.2 mg IVP every 6 hours as needed.      Problem/Plan - 4:  ·  Problem: Delirium.  Plan: ativan 0.5 mg IVP every 4 hours as needed.      Problem/Plan - 5:  ·  Problem: ESRD (end stage renal disease).  Plan: admit to inpatient hospice.      Problem/Plan - 6:  Problem: Constipation due to opioid therapy. Plan: fleet enema three times weekly as needed.     Problem/Plan - 7:  ·  Problem: Skin abnormalities.  Plan: treatment as per protocol.        Attending Statement:  Patient is DNR.  Discussed with son at bedside.  All questions answered.

## 2017-12-23 NOTE — DISCHARGE NOTE FOR THE EXPIRED PATIENT - HOSPITAL COURSE
72 M with pmh of ESRD, dementia, DM and Htn was brought to ED by family for comfort measures for the patient. Family refused hemodialysis. Palliative consult was called, family signed DNR and agreed for inpatient hospice. He was transferred to inpatient hospice on 12/21/2017. He was placed on Dilaudid and ativan prn IVP for comfort care.    RN paged that patient was non responsive and stopped breathing at 13: 50 (12/23/2017). Assessed patient on bedside. No chest movements appreciated. There are no breathing and heart sounds on auscultation. Direct and consensual pupillary light reflexes are absent. Vestibuloocular reflex is absent. Patient was pronounced dead at 14:05 pm (12/23/207). Son Hector Simpson and attending were informed. Family does not want autopsy.

## 2017-12-23 NOTE — PROGRESS NOTE ADULT - SUBJECTIVE AND OBJECTIVE BOX
· Subjective and Objective: 	    Chief Complaint/Reason for Admission: dyspnea	  History of Present Illness: 	  72 year old male with hospice diagnosis of ESRD secondary to HTN and DM, dementia brought to ER after family decided to stop HD.    The patient was noted to have dyspnea and increased tremors.   Patient seen at bedside,no breething,pupils dilated,bp absent        Review of Systems:  Review of Systems: not obtainable secondary to lethargy	      Allergies and Intolerances:        Allergies:  	No Known Allergies:     Home Medications:   * Patient Currently Takes Medications as of 20-Dec-2017 14:29 documented in Structured Notes  · 	hydrALAZINE 10 mg oral tablet: 1 tab(s) orally every 6 hours  · 	cloNIDine 0.1 mg oral tablet: 1 tab(s) orally once a day         Physical Exam:  Physical Exam: dilated pupils,no rsp drive,non-responsive    HEENT: moderate bitemporal wasting  Neck: no JVD no nodes no thyromegaly  Lungs: decreased breath sounds and rales at bases  Heart: s1s2 no audible murmurs  Abd: soft ND NT no masses no organomegaly Ext: no edema no redness no calf tenderness	    Assessment and Plan:    Assessment:  · Assessment		  72 year old male with hospice diagnosis of ESRD and dementia admitted to the IPU at [facility] for IV management of dyspnea.     Problem/Plan - 1:  ·  Problem: Dyspnea, -patient  and medical staff will be aware for death certificate

## 2017-12-23 NOTE — DISCHARGE NOTE FOR THE EXPIRED PATIENT - NS EXPIRED ORGANCONFIRMRES
yes
Quality 111:Pneumonia Vaccination Status For Older Adults: Pneumococcal Vaccination not Administered or Previously Received, Reason not Otherwise Specified
Detail Level: Detailed
Quality 431: Preventive Care And Screening: Unhealthy Alcohol Use - Screening: Patient screened for unhealthy alcohol use using a single question and scores less than 2 times per year
Quality 226: Preventive Care And Screening: Tobacco Use: Screening And Cessation Intervention: Patient screened for tobacco and never smoked
Quality 110: Preventive Care And Screening: Influenza Immunization: Influenza Immunization Ordered or Recommended, but not Administered due to system reason

## 2018-08-16 NOTE — H&P ADULT - PROBLEM SELECTOR PROBLEM 2
Physician Documentation                                                                           

 [facility]                                                                

Name: Dayne Wells                                                                                  

Age: 51 yrs                                                                                       

Sex: Male                                                                                         

: 1967                                                                                   

MRN: [id number]                                                                                   

Arrival Date: 2018                                                                          

Time: 10:44                                                                                       

Account#: [id number]                                                                            

Bed 20                                                                                            

Private MD:                                                                                       

ED Physician Filemon May                                                                       

HPI:                                                                                              

                                                                                             

11:36 This 51 yrs old Male presents to ER via Wheelchair with complaints of Chest Pain.       gs  

11:36 The patient or guardian reports chest pain that is located primarily in the anterior    gs  

      chest wall. Onset: yesterday. The pain does not radiate. Associated signs and symptoms:     

      Pertinent positives: shortness of breath. The chest pain is described as a heaviness.       

      Duration: The patient or guardian reports multiple episodes, that wax and wane, with no     

      pattern. Modifying factors: The symptoms are alleviated by nothing. the symptoms are        

      aggravated by nothing. Severity of pain: At its worst the pain was moderate in the          

      emergency department the pain has resolved. The patient has experienced similar             

      episodes in the past, several times.                                                        

                                                                                                  

Historical:                                                                                       

- Allergies:                                                                                      

10:48 No Known Allergies;                                                                     rb1 

- Home Meds:                                                                                      

10:48 Coreg Oral [Active]; Lisinopril Oral [Active]; Plavix Oral [Active]; atorvastatin oral  rb1 

      oral [Active];                                                                              

- PMHx:                                                                                           

10:48 Myocardial infarction;                                                                  rb1 

- PSHx:                                                                                           

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 

                                                                                                  

- Immunization history:: Adult Immunizations up to date.                                          

- Social history:: Smoking status: Patient uses tobacco products, SMOKES MARIJUANA                

  DAILY.                                                                                          

- Ebola Screening: : Patient negative for fever greater than or equal to 101.5 degrees            

  Fahrenheit, and additional compatible Ebola Virus Disease symptoms.                             

                                                                                                  

                                                                                                  

ROS:                                                                                              

11:36 All other systems are negative.                                                         gs  

                                                                                                  

Exam:                                                                                             

11:36 Head/Face:  Normocephalic, atraumatic. Eyes:  Pupils equal round and reactive to light, gs  

      extra-ocular motions intact.  Lids and lashes normal.  Conjunctiva and sclera are           

      non-icteric and not injected.  Cornea within normal limits.  Periorbital areas with no      

      swelling, redness, or edema. ENT:  Nares patent. No nasal discharge, no septal              

      abnormalities noted.  Tympanic membranes are normal and external auditory canals are        

      clear.  Oropharynx with no redness, swelling, or masses, exudates, or evidence of           

      obstruction, uvula midline.  Mucous membranes moist. Neck:  Trachea midline, no             

      thyromegaly or masses palpated, and no cervical lymphadenopathy.  Supple, full range of     

      motion without nuchal rigidity, or vertebral point tenderness.  No Meningismus.             

      Chest/axilla:  Normal chest wall appearance and motion.  Nontender with no deformity.       

      No lesions are appreciated. Cardiovascular:  Regular rate and rhythm with a normal S1       

      and S2.  No gallops, murmurs, or rubs.  Normal PMI, no JVD.  No pulse deficits.             

      Respiratory:  Lungs have equal breath sounds bilaterally, clear to auscultation and         

      percussion.  No rales, rhonchi or wheezes noted.  No increased work of breathing, no        

      retractions or nasal flaring. Abdomen/GI:  Soft, non-tender, with normal bowel sounds.      

      No distension or tympany.  No guarding or rebound.  No evidence of tenderness               

      throughout. Back:  No spinal tenderness.  No costovertebral tenderness.  Full range of      

      motion. Skin:  Warm, dry with normal turgor.  Normal color with no rashes, no lesions,      

      and no evidence of cellulitis. MS/ Extremity:  Pulses equal, no cyanosis.                   

      Neurovascular intact.  Full, normal range of motion. Neuro:  Awake and alert, GCS 15,       

      oriented to person, place, time, and situation.  Cranial nerves II-XII grossly intact.      

      Motor strength 5/5 in all extremities.  Sensory grossly intact.  Cerebellar exam            

      normal.  Normal gait.                                                                       

11:36 Constitutional: The patient appears alert, awake.                                           

11:36 ECG was reviewed by the Attending Physician.                                                

                                                                                                  

Vital Signs:                                                                                      

10:48  / 86; Pulse 64; Resp 15; Pulse Ox 98% on R/A; Weight 82.55 kg; Height 5 ft. 4    rb1 

      in. (162.56 cm); Pain 6/10;                                                                 

11:47  / 69; Pulse 57; Resp 12; Pulse Ox 98% on R/A;                                    rb1 

12:45  / 86; Pulse 68; Resp 13; Pulse Ox 97% ;                                          rb1 

13:38  / 80; Pulse 72; Resp 14; Pulse Ox 97% on R/A;                                    rb1 

14:15  / 79; Pulse 71; Resp 21; Pulse Ox 97% on R/A;                                    rb1 

15:15  / 80; Pulse 68; Resp 19; Pulse Ox 98% on R/A;                                    rb1 

10:48 Body Mass Index 31.24 (82.55 kg, 162.56 cm)                                             rb1 

                                                                                                  

MDM:                                                                                              

11:00 Patient medically screened.                                                               

11:36 Differential diagnosis: acute myocardial infarction, pneumonia, stable angina, unstable gs  

      angina. Data reviewed: vital signs, nurses notes.                                           

12:34 Response to treatment: the patient's symptoms have resolved after treatment, and as a   gs  

      result, I will admit patient.                                                               

                                                                                                  

                                                                                             

10:59 Order name: Basic Metabolic Panel; Complete Time: 12:26                                   

                                                                                             

10:59 Order name: CBC with Diff; Complete Time: 12:                                           

                                                                                             

10:59 Order name: Magnesium; Complete Time: 12:                                               

                                                                                             

10:59 Order name: NT PRO-BNP; Complete Time: 12:                                              

                                                                                             

10:59 Order name: PT-INR; Complete Time: 12:                                                  

                                                                                             

10:59 Order name: Troponin (emerg Dept Use Only); Complete Time: 12:                          

                                                                                             

10:59 Order name: XRAY Chest (1 view); Complete Time: 12:                                     

                                                                                             

10:59 Order name: EKG; Complete Time: 11:00                                                     

                                                                                             

10:59 Order name: Cardiac monitoring; Complete Time: 11:30                                      

                                                                                             

10:59 Order name: EKG - Nurse/Tech; Complete Time: 11:30                                        

                                                                                             

12:53 Order name: CONS Physician Consult                                                      [facility]

                                                                                             

12:54 Order name: CONS Physician Consult                                                      [facility]

                                                                                             

10:59 Order name: IV Saline Lock; Complete Time: 11:30                                          

                                                                                             

10:59 Order name: Labs collected and sent; Complete Time: 11:30                                 

                                                                                             

10:59 Order name: O2 Per Protocol; Complete Time: 11:                                         

                                                                                             

10:59 Order name: O2 Sat Monitoring; Complete Time: 11:30                                       

                                                                                                  

EC:36 Rate is 57 beats/min. Rhythm is regular. AR interval is normal. QRS interval is normal.   

      Q waves are Old in leads II, III, aVF. T waves are Flattened. No ST changes noted.          

      Clinical impression: NSR w/ Non-specific ST/T Changes. Interpreted by me.                   

                                                                                                  

Administered Medications:                                                                         

12:55 Drug: Aspirin Chewable Tablet 324 mg Route: PO;                                         rb1 

13:14 Follow up: Response: No adverse reaction                                                rb1 

13:33 Drug: Lovenox 1 mg/kg Route: Sub-Q; Site: right lower abdomen;                          rb1 

13:58 Follow up: Response: No adverse reaction                                                rb1 

                                                                                                  

                                                                                                  

Disposition:                                                                                      

12:34 Critical Care:.                                                                         gs  

                                                                                                  

Disposition:                                                                                      

18 12:35 Hospitalization ordered by Yusuf Neri for Inpatient Admission. Preliminary        

  diagnosis is Unstable angina.                                                                   

- Bed requested for Telemetry/MedSurg (Inpatient).                                                

- Status is Inpatient Admission.                                                              rb1 

- Condition is Stable.                                                                            

- Problem is new.                                                                                 

- Symptoms have improved.                                                                         

UTI on Admission? No                                                                              

                                                                                                  

                                                                                                  

                                                                                                  

Critical care time excluding procedures:                                                          

12:34 Critical care time: Bedside Care: 10 minutes, Consultation: 10 minutes, Family          gs  

      Intervention: 10 minutes. Total time: 30 minutes                                            

                                                                                                  

Signatures:                                                                                       

Dispatcher [employer]                           Riya Tejeda RN                        RN                                                      

Makenna Quan RN                     RN   rb1                                                  

MayFilemon MD MD                                                      

                                                                                                  

Corrections: (The following items were deleted from the chart)                                    

14:12 12:35 Hospitalization Ordered by Yusuf Neri MD for Inpatient Admission. Preliminary     dw  

      diagnosis is Unstable angina. Bed requested for Telemetry/MedSurg (Inpatient). Status       

      is Inpatient Admission. Condition is Stable. Problem is new. Symptoms have improved.        

      UTI on Admission? No. gs                                                                    

15:33 14:12 2018 12:35 Hospitalization Ordered by Yusuf Neri MD for Inpatient           rb1 

      Admission. Preliminary diagnosis is Unstable angina. Bed requested for                      

      Telemetry/MedSurg (Inpatient). Status is Inpatient Admission. Condition is Stable.          

      Problem is new. Symptoms have improved. UTI on Admission? No. dw                            

                                                                                                  

**************************************************************************************************
Verified Results  US THYROID [id number] 07:59AM Sherrell Bhakta    Order Number: [id number]    - Patient Instructions: To schedule this appointment, please contact Central Scheduling at [phone]  Test Name Result Flag Reference   US THYROID (Report)     THYROID ULTRASOUND     INDICATION: Difficulty swallowing  On Synthroid  COMPARISON: 4/4/2012     TECHNIQUE:  Ultrasound of the thyroid was performed with a high frequency linear transducer in transverse and sagittal planes including volumetric imaging sweeps as well as traditional still imaging technique  FINDINGS:   Subcentimeter left-sided thyroid nodule noted, not definitely present previously  Right gland: 4 5 x 0 9 x 1 3 cm  No dominant nodules  Left gland: 4 x 1 1 x 1 4 cm  Heterogeneous, spongiform nodule measuring 0 7 x 0 3 x 0 6 cm noted in the upper pole of the level of the thyroid gland, posteriorly  Isthmus: The isthmus is 0 2 cm in AP dimension  IMPRESSION:      7 mm left upper pole spongiform nodule  Surveillance ultrasound in one year suggested  ##[id number]             Workstation performed: [id number]     Signed by:    Trent Isaacs MD   5/23/17
Pain

## 2022-06-10 NOTE — ED PROVIDER NOTE - CHIEF COMPLAINT
Chief Complaint   Patient presents with    Shoulder Pain    Skin Problem     3 most recent PHQ Screens 6/10/2022   Little interest or pleasure in doing things Not at all   Feeling down, depressed, irritable, or hopeless Not at all   Total Score PHQ 2 0     1. Have you been to the ER, urgent care clinic since your last visit? Hospitalized since your last visit?no    2. Have you seen or consulted any other health care providers outside of the [address] [place] [address] since your last visit? Include any pap smears or colon screening.  no The patient is a 72y Male complaining of altered mental status.

## 2024-12-04 NOTE — ED ADULT NURSE NOTE - PAIN RATING/NUMBER SCALE (0-10): ACTIVITY
Goal Outcome Evaluation:  Plan of Care Reviewed With: patient        Progress: improving  Outcome Evaluation: Pt seen for PT/OT cotx this PM and tolerated the session fairly. Today, pt required encouragement to participate and was ModA x1 up to Min/ModA x2 for bed mobility. While EOB pt stood w/ MaxA x2 to the RW and was able to take one L side step, but abruptly returned to sitting EOB. Attempted a second stand w/ pt unable to clear his bottom from the bed. Pt performed UE/LE ther-ex prior to STS attempts. Pt was quick to fatigue and returned to supine to end the session. Encouraged pt to trying getting UIC for 1-3 meals w/ nsg assist to increase his activity tolerance; he v/u. PT will cont to follow and rec rehab at [place].    Anticipated Discharge Disposition (PT): skilled nursing facility                         5